# Patient Record
Sex: FEMALE | Race: WHITE
[De-identification: names, ages, dates, MRNs, and addresses within clinical notes are randomized per-mention and may not be internally consistent; named-entity substitution may affect disease eponyms.]

---

## 2017-10-12 ENCOUNTER — HOSPITAL ENCOUNTER (EMERGENCY)
Dept: HOSPITAL 36 - ER | Age: 37
LOS: 1 days | Discharge: HOME | End: 2017-10-13
Payer: MEDICAID

## 2017-10-12 DIAGNOSIS — R07.89: Primary | ICD-10-CM

## 2017-10-12 PROCEDURE — Z7502: HCPCS

## 2017-10-12 NOTE — ED PHYSICIAN CHART
ED Chief Complaint/HPI





- Patient Information


Date Seen:: 10/12/17


Time Seen:: 23:10


Chief Complaint:: chest pain


History of Present Illness:: 





4 hours ago the patient developed pleuritic right-sided chest pain right-sided 

chest pain.  Patient's had no recent upper respiratory tract infection, fever 

or cough.


Allergies:: 


 Allergies











Allergy/AdvReac Type Severity Reaction Status Date / Time


 


No Known Allergies Allergy   Verified 10/12/17 22:55











Vitals:: 


 Vital Signs - 8 hr











  10/12/17





  22:45


 


Temp 98.1 F


 


HR 79


 


RR 18


 


/98


 


O2 Sat % 98











Historian:: Patient, Family Member


Review:: Nurse's Note Reviewed





ED Review of Systems





- Review of Systems


General/Constitutional: No fever, No chills


Skin: No skin lesions, No bruising


Head: No headache


Eyes: No loss of vision


ENT: No earache, No sore throat


Neck: No neck pain, No swelling, No thyromegaly, No stiffness


Cardio Vascular: Chest pain


Pulmonary: No SOB, No cough, No sputum, No wheezing


GI: No nausea, No vomiting, No diarrhea


G/U: No dysuria


Musculoskeletal: No bone or joint pain, No back pain, No muscle pain


Endocrine: No polyuria, No polydipsia


Psychiatric: No prior psych history


Hematopoietic: No bruising


Allergic/Immuno: No urticaria


Neurological: No syncope, No headache





ED Past Medical History





- Past Medical History


Past Medical History: No significant medical hx


Family History: None


Social History: Non Smoker, No Alcohol


Surgical History: None


Psychiatricy History: None


Medication: None





Family Medical History





- Family Member


  ** Mother


History Unknown: Yes


Ethnicity: Non-





ED Physical Exam





- Physical Examination


General/Constitutional: Well-developed, well-nourished, Alert, No distress


Head: Atraumatic


Eyes: Lids, conjuctiva normal, PERRL


Skin: Nl inspection, No rash, No skin lesions, No ecchymosis, Well hydrated


ENMT: External ears, nose nl, Lips, teeth, gums nl


Neck: No nuchal rigidity


Respiratory: Nl effort/Exclusion, Clear to Auscultation, No Wheeze/Rhonchi/Rales


Other Respiratory comments:: 





Clear symmetrical breath sounds


Cardio Vascular: RRR, No murmur, gallop, rubs, NL S1 S2


GI: No tenderness/rebounding/guarding, No organomegaly, No hernia, Normal BS's, 

Nondistended, No mass/bruits, No McBurney tenderness


: No CVA tenderness


Extremities: Normal digits & nails


Neuro/Psych: Alert/oriented, No focal deficits


Misc: Normal back





ED Assessment





- Assessment


General Assessment: 





Patient's pain improved after the albuterol breathing treatment





ED Septic Shock





- .


Is Septic Shock (SBP<90, OR Lactate>4 mmol\L) present?: No





- <6hrs of presentation:


Vital Signs: 


 Vital Signs - 8 hr











  10/12/17





  22:45


 


Temp 98.1 F


 


HR 79


 


RR 18


 


/98


 


O2 Sat % 98














ED Reassessment (Disposition)





- Reassessment


Reassessment Condition:: Improved





- Diagnosis


Diagnosis:: 





Atypical chest pain





- Aftercare/Follow up Instructions


Aftercare/Follow-Up Instructions:: Refer to Discharge Instructions





- Patient Disposition


Discharge/Transfer:: Home


Condition at Disposition:: Stable, Improved

## 2018-04-30 ENCOUNTER — HOSPITAL ENCOUNTER (EMERGENCY)
Dept: HOSPITAL 36 - ER | Age: 38
Discharge: HOME | End: 2018-04-30
Payer: MEDICAID

## 2018-04-30 DIAGNOSIS — R51: Primary | ICD-10-CM

## 2018-04-30 LAB
ALBUMIN SERPL-MCNC: 3.7 GM/DL (ref 3.7–5.3)
ALBUMIN/GLOB SERPL: 1.2 {RATIO} (ref 1–1.8)
ALP SERPL-CCNC: 36 U/L (ref 34–104)
ALT SERPL-CCNC: 12 U/L (ref 7–52)
AMPHET UR-MCNC: NEGATIVE NG/ML
ANION GAP SERPL CALC-SCNC: 11.9 MMOL/L (ref 7–16)
AST SERPL-CCNC: 13 U/L (ref 13–39)
BARBITURATES UR-MCNC: NEGATIVE UG/ML
BASOPHILS # BLD AUTO: 0 TH/CUMM (ref 0–0.2)
BASOPHILS NFR BLD AUTO: 0 % (ref 0–2)
BENZODIAZEPINES PNL UR: NEGATIVE
BILIRUB SERPL-MCNC: 0.4 MG/DL (ref 0.3–1)
BUN SERPL-MCNC: 8 MG/DL (ref 7–25)
CALCIUM SERPL-MCNC: 9.5 MG/DL (ref 8.6–10.3)
CANNABINOIDS SERPL QL CFM: NEGATIVE
CHLORIDE SERPL-SCNC: 105 MEQ/L (ref 98–107)
CHOLEST SERPL-MCNC: 198 MG/DL (ref ?–200)
CO2 SERPL-SCNC: 25.5 MEQ/L (ref 21–31)
COCAINE METAB.OTHER UR-MCNC: NEGATIVE NG/ML
CREAT SERPL-MCNC: 0.7 MG/DL (ref 0.6–1.2)
EOSINOPHIL # BLD AUTO: 0.2 TH/CMM (ref 0.1–0.4)
EOSINOPHIL NFR BLD AUTO: 1.6 % (ref 0–5)
ERYTHROCYTE [DISTWIDTH] IN BLOOD BY AUTOMATED COUNT: 13.8 % (ref 11.5–20)
GLOBULIN SER-MCNC: 3.2 GM/DL
GLUCOSE SERPL-MCNC: 86 MG/DL (ref 70–105)
HCT VFR BLD CALC: 39.8 % (ref 41–60)
HDLC SERPL-MCNC: 37 MG/DL (ref 23–92)
HGB BLD-MCNC: 13 GM/DL (ref 12–16)
LYMPHOCYTE AB SER FC-ACNC: 4.6 TH/CMM (ref 1.5–3)
LYMPHOCYTES NFR BLD AUTO: 44.6 % (ref 20–50)
MCH RBC QN AUTO: 24.4 PG (ref 27–31)
MCHC RBC AUTO-ENTMCNC: 32.8 PG (ref 28–36)
MCV RBC AUTO: 74.5 FL (ref 81–100)
METHADONE UR CFM-MCNC: NEGATIVE NG/ML
METHAMPHET UR QL: NEGATIVE
MONOCYTES # BLD AUTO: 0.8 TH/CMM (ref 0.3–1)
MONOCYTES NFR BLD AUTO: 7.8 % (ref 2–10)
NEUTROPHILS # BLD: 4.7 TH/CMM (ref 1.8–8)
NEUTROPHILS NFR BLD AUTO: 46 % (ref 40–80)
OPIATES UR QL: NEGATIVE
PCP UR-MCNC: NEGATIVE UG/L
PLATELET # BLD: 363 TH/CMM (ref 150–400)
PMV BLD AUTO: 8.3 FL
POTASSIUM SERPL-SCNC: 4.4 MEQ/L (ref 3.5–5.1)
RBC # BLD AUTO: 5.34 MIL/CMM (ref 3.8–5.1)
SODIUM SERPL-SCNC: 138 MEQ/L (ref 136–145)
TRICYCLICS UR QL: NEGATIVE
TRIGL SERPL-MCNC: 136 MG/DL (ref ?–150)
WBC # BLD AUTO: 10.3 TH/CMM (ref 4.8–10.8)

## 2018-04-30 PROCEDURE — 99285 EMERGENCY DEPT VISIT HI MDM: CPT

## 2018-04-30 PROCEDURE — 80053 COMPREHEN METABOLIC PANEL: CPT

## 2018-04-30 PROCEDURE — 80061 LIPID PANEL: CPT

## 2018-04-30 PROCEDURE — 85025 COMPLETE CBC W/AUTO DIFF WBC: CPT

## 2018-04-30 PROCEDURE — 86141 C-REACTIVE PROTEIN HS: CPT

## 2018-04-30 PROCEDURE — 36415 COLL VENOUS BLD VENIPUNCTURE: CPT

## 2018-04-30 PROCEDURE — 70450 CT HEAD/BRAIN W/O DYE: CPT

## 2018-04-30 PROCEDURE — 80307 DRUG TEST PRSMV CHEM ANLYZR: CPT

## 2018-04-30 PROCEDURE — Z7610: HCPCS

## 2018-04-30 PROCEDURE — 81025 URINE PREGNANCY TEST: CPT

## 2018-04-30 NOTE — TRANSFER SUMMARY
DATE OF TRANSFER:  04/30/2018



ADDENDUM AND FINAL DISCHARGE REPORT



Full code patient.  A 37-year-old female.



LABORATORY DATA:  WBC 10.3, hemoglobin 13, hematocrit 39.8, differential is

normal.  Electrolytes are all within normal limits.  BUN is 8, creatinine is 0.7

and glucose is 86.  The patient's LDL level is very high at 147.  Ideally it

should be less than 90 and less than 70 is preferable and she should not eat too

much of red meat and all kinds of pork and beef she is eating.  She should cut

back on the portions, cut back on high cholesterol and fat containing food and

advice from a dietitian may be more helpful to her.  The patient's CT scan has

come to be negative.  She finally acknowledged that she is taking for past 3

years, nonsteroidal anti-inflammatory medication.  It is possible that she may

be hypertensive for the past 3 years and the patient is discharged today with

Lipitor 20 mg at bedtime and she needs to take Lopressor 50 mg p.o. morning. 

She should not take any nonsteroidal anti-inflammatory drugs like Aleve, Motrin,

Toradol, Ultram or any other nonsteroidal anti-inflammatory drugs like

diclofenac etc.  The patient should cut back on portions of the cholesterol or

cholesterol containing compounds.  All instructions were given to the patient.



FINAL DIAGNOSES:

1.  Headache.

2.  Hyperlipidemia.

3.  The patient has may be mild gastritis secondary to using Motrin and she has

been using it for the past 3 years.

4.  Slightly overweight situation and all these things advised for this were all

given to the patient and the patient will not take Motrin and not use any other

drugs.





DD: 04/30/2018 22:06

DT: 04/30/2018 22:46

JOB# [id number]  [id number]

## 2018-04-30 NOTE — ER PHYSICIAN DOCUMENTATION
DATE OF SERVICE:  



EMERGENCY ROOM EVALUATION AND TREATMENT



HISTORY OF PRESENT ILLNESS:  This is a 37-year-old female patient who said that

around 4:00 when she got up to go to the bathroom, suddenly she had severe

headache in the occipital part of the headache and the entire head was hurting,

but more in the occipital part of the head and it continued the whole day.  She

took the doctor's appointment at 3:00.  She took 800 mg of Motrin to kill the

pain, but the pain did not go away.  She went and saw the doctor.  The doctor

gave her 5 mg of lisinopril and the patient was told to come to the Emergency

Room.  The patient did not take the medication.  She came to the hospital.  The

patient's past medical history is negative for hypertension.  She denied taking

any narcotics weeds, marijuana, or any other drugs.  Her drug screen was ordered

to see if there is anything positive in the drug.  Urine pregnancy test has been

ordered, I think it was negative, if not we can look at it again to see.  The

patient's blood pressure ever since that 3-4 times has been taken.  It is always

above around _____ and blood pressure is 145 is the lowest systolic blood

pressure.  The patient never had hypertension in her life according to her.  Her

parents never had hypertension.  She does not know much about the parents, her

mother and father, because they live in [place] and she lives here.  She did not

take any medication else other than 800 mg of Motrin.  Review of current

illnesses is that the patient got sudden headache and she did not blow up any

balloon or did not have any head injury.  There is no evidence of any meningeal

signs, Kernig's signs or Brudzinski sign.  There are no meningeal signs. 

Straight leg raising test is negative and there is no nuchal rigidity.  There is

no evidence of any tumor type of thing.  There is no seizure.  There are no

tremors.



PAST MEDICAL HISTORY:  Benign and negative.



PERSONAL HISTORY:  , 3 children.



ALLERGIES:  None known.



REVIEW OF SYSTEMS:

GENERAL:  Twelve-point review of system, she is awake, alert.

CONSTITUTIONAL:  Negative.  No fever, no chills, no rigors.  Does not take any

drugs.

CARDIAC:  No history of any chest pain.  No history of any myocardial

infarction, rheumatic fever, valvular heart disease, pericardial disease,

cardiomyopathy.

LUNGS WISE:  Clear.  She does not have any pneumonia or TB pulmonary embolism,

COPD, emphysema, bronchitis.

GI WISE:  No history of diarrhea, constipation, or vomiting.

ENDOCRINE WISE:  No history of diabetes mellitus, hypo or hyperthyroidism. 

Bones and joints, no apparent complaints.



A 12-point review of systems otherwise is benign and negative.



She does not take any medication.  Today, only for the first time she took 800

mg of Motrin and it surprising that for the first time without knowing about the

medications she is taking 800 mg of Motrin that suggests that it is very

possible that she might have taken Motrin or some other drugs that might thinned

up the blood or antiplatelet medications and might have bled in the brain, but

she does not look like she is a severe sick patient.  She looks calm.  She looks

comfortable.



PHYSICAL EXAMINATION:

GENERAL:  The patient appears to be awake, alert, oriented.  General examination

is otherwise benign and negative.  Meningeal signs as negative.  No edema, no

cyanosis, no petechia, no ecchymosis.

CHEST:  Clear.  Trachea being central.  Fairly good air entry in both lungs.

HEART:  Reveals normal heart sounds.  Soft fourth heart sound.  Second heart

sound is physiologically split.  Third heart sound is absent.  Fourth heart

sound is soft, distant, benign, negative.  No abnormal murmur, no abnormal

systolic, no abnormal diastolic, no evidence of any pericardial rub.  No

evidence of any systolic murmur of aortic stenosis.  No evidence of any fevers,

chills or rigors.  No evidence of any sepsis or septicemia, etc. present.



Urine appeared to be clear, but it sent for urine examinations.  Urine for drug

screen has been ordered by me.



The patient is ordered to get lisinopril 10 mg, metoprolol 50 mg, Zofran 4 mg by

mouth and Tylenol 1 gram by mouth right now.  The patient surgical wise had 3

surgeries, 3 C-sections and she does not know why 3 C-sections were done.  She

was told to take 3 , she went ahead and had 3 babies with 3 C-sections,

2 girls and one son.



CLINICAL IMPRESSION:  The patient had sudden onset of headache.  She does not

look like having ruptured aneurysm, but one thing we need to rule out is

ruptured berry aneurysm.  I do not think so, but we will rule it out. 

Intracranial hemorrhage and any other possibilities, we will rule it out.  So,

CT scan will be done to rule out if there is anything suddenly happen to her. 

It is possible that she may not be telling us the truth that she is taking

Motrin on a regular basis and that can cause a bleeding problem and cause

platelet dysfunction and may cause intracranial bleeding also; GI bleeding also

is a very possibility.  Doctor gave her 5 mg of lisinopril that they did not

take it.  The patient's pain is 10/10, but looking at her, it does not even look

like 3/10 because she does not have that kind of pain. 

Vital signs taken by the triage nurse shows temperature to be 98.2, pulse of 82,

respirations 18, blood pressure 141/98, then it became 145/95 and then 143/90 or

so in that level, 99% oxygen saturation, height of 154 cm, weight 162 pounds. 

She is mildly overweight.  Last menstrual period is going on now.    x

3 and she does not take any drugs or other medications.  So, the plan is to get

a CT scan of the brain done and it is possible that the patient may be

hypertensive for a long time, but she says she has never been hypertensive, so I

am not sure what is the cause.  It could be some things that she ate or some

injury to the head or it is just a simple migraine to start with can give rise

to this kind of symptoms.  So as long as the CT scan is normal, we can just give

her Tylenol and Motrin etc. as needed.





DD: 2018 21:20

DT: 2018 22:36

JOB# [id number]  [id number]

## 2018-05-01 NOTE — DIAGNOSTIC IMAGING REPORT
CT scan of the brain without contrast 



History: Headache



Total DLP equals 497



CTDI equals 30.3



Axial sections were obtained from the base of the skull to the vertex.



There is a normal ventricular system size. No focal parenchymal lesions

are seen. No evidence of any mass effect or shift of midline structures.

No extra-axial masses or abnormal fluid collections.



Impression: Negative examination

## 2019-06-11 ENCOUNTER — HOSPITAL ENCOUNTER (EMERGENCY)
Dept: HOSPITAL 36 - ER | Age: 39
Discharge: HOME | End: 2019-06-11
Payer: MEDICAID

## 2019-06-11 DIAGNOSIS — Z98.890: ICD-10-CM

## 2019-06-11 DIAGNOSIS — M54.2: ICD-10-CM

## 2019-06-11 DIAGNOSIS — H66.92: Primary | ICD-10-CM

## 2019-06-11 PROCEDURE — 99283 EMERGENCY DEPT VISIT LOW MDM: CPT

## 2019-06-11 PROCEDURE — 96372 THER/PROPH/DIAG INJ SC/IM: CPT

## 2019-06-11 PROCEDURE — Z7502: HCPCS

## 2019-06-11 NOTE — ED PHYSICIAN CHART
ED Chief Complaint/HPI





- Patient Information


Date Seen:: 19


Time Seen:: 22:44


Chief Complaint:: Left ear pain


History of Present Illness:: 


39 yo female had left ear pain, left facial and left upper neck pain for 1 

week. Pt noticed some blood in the left ear last week. Pt denied hearing loss 

or fever. Pt saw her PMD who prescribed Keflex 500 mg q6h for 7 days. Pt 

finished the antibiotics and still had significant left ear pain. Pt also felt 

headache and dizziness. 


Allergies:: 


 Allergies











Allergy/AdvReac Type Severity Reaction Status Date / Time


 


No Known Allergies Allergy   Verified 19 22:36











Vitals:: 


 Vital Signs - 8 hr











  19





  22:30


 


Temp 99.0 F


 


HR 77


 


RR 18


 


/83


 


O2 Sat % 99














ED Review of Systems





- Review of Systems


General/Constitutional: No fever, No chills


Skin: No rash


Head: Headache


Eyes: No pain


ENT: No nasal drainage


Neck: Neck pain


Cardio Vascular: No chest pain


Pulmonary: No SOB


GI: No nausea, No vomiting


G/U: No dysuria


Musculoskeletal: No bone or joint pain


Neurological: No focal symptoms





ED Past Medical History





- Past Medical History


Past Medical History: No significant medical hx


Social History: Non Smoker, No Alcohol, No Drug Use


Surgical History:  (x 3, )





Family Medical History





- Family Member


  ** Mother


History Unknown: Yes


Ethnicity: Non-





ED Assessment





- Assessment


General Assessment: 


Otitis media, failed antibiotics, Keflex





Assessment/Comments:: 


Rocephin 1g IM x 1


D/c home


Augmentin 875/125 mg PO q12h x 10 days





ED Septic Shock





- .


Is Septic Shock (SBP<90, OR Lactate>4 mmol\L) present?: No





- <6hrs of presentation:


Vital Signs: 


 Vital Signs - 8 hr











  19





  22:30


 


Temp 99.0 F


 


HR 77


 


RR 18


 


/83


 


O2 Sat % 99














ED Reassessment (Disposition)





- Reassessment


Reassessment Condition:: Improved





- Aftercare/Follow up Instructions


Notes:: 


F/u PCP and ENT, or return to ER if symptoms worsen.





Medication Prescribed:: 


Augmentin 875/125 mg PO bid x 10 days





- Patient Disposition


Discharge/Transfer:: Home

## 2019-09-10 ENCOUNTER — HOSPITAL ENCOUNTER (EMERGENCY)
Dept: HOSPITAL 36 - ER | Age: 39
LOS: 1 days | Discharge: LEFT BEFORE DISCHARGE | End: 2019-09-11
Payer: MEDICAID

## 2019-09-10 DIAGNOSIS — K81.0: ICD-10-CM

## 2019-09-10 DIAGNOSIS — Z98.890: ICD-10-CM

## 2019-09-10 DIAGNOSIS — N39.0: Primary | ICD-10-CM

## 2019-09-10 DIAGNOSIS — I10: ICD-10-CM

## 2019-09-10 DIAGNOSIS — K44.9: ICD-10-CM

## 2019-09-10 DIAGNOSIS — D72.829: ICD-10-CM

## 2019-09-10 DIAGNOSIS — N85.8: ICD-10-CM

## 2019-09-10 LAB
ALBUMIN SERPL-MCNC: 4.3 GM/DL (ref 3.7–5.3)
ALBUMIN/GLOB SERPL: 1.4 {RATIO} (ref 1–1.8)
ALP SERPL-CCNC: 50 U/L (ref 34–104)
ALT SERPL-CCNC: 11 U/L (ref 7–52)
AMYLASE SERPL-CCNC: 87 U/L (ref 29–103)
ANION GAP SERPL CALC-SCNC: 11.7 MMOL/L (ref 7–16)
AST SERPL-CCNC: 13 U/L (ref 13–39)
BASOPHILS # BLD AUTO: 0 TH/CUMM (ref 0–0.2)
BASOPHILS NFR BLD AUTO: 0 % (ref 0–2)
BILIRUB SERPL-MCNC: 0.4 MG/DL (ref 0.3–1)
BUN SERPL-MCNC: 17 MG/DL (ref 7–25)
CALCIUM SERPL-MCNC: 9.7 MG/DL (ref 8.6–10.3)
CHLORIDE SERPL-SCNC: 102 MEQ/L (ref 98–107)
CO2 SERPL-SCNC: 27.6 MEQ/L (ref 21–31)
CREAT SERPL-MCNC: 0.7 MG/DL (ref 0.6–1.2)
EOSINOPHIL # BLD AUTO: 0.3 TH/CMM (ref 0.1–0.4)
EOSINOPHIL NFR BLD AUTO: 2.6 % (ref 0–5)
ERYTHROCYTE [DISTWIDTH] IN BLOOD BY AUTOMATED COUNT: 14 % (ref 11.5–20)
GLOBULIN SER-MCNC: 3 GM/DL
GLUCOSE SERPL-MCNC: 93 MG/DL (ref 70–105)
HCT VFR BLD CALC: 43.8 % (ref 41–60)
HGB BLD-MCNC: 14.2 GM/DL (ref 12–16)
INR PPP: 0.95 (ref 0.5–1.4)
LIPASE SERPL-CCNC: 61 U/L (ref 11–82)
LYMPHOCYTE AB SER FC-ACNC: 5.9 TH/CMM (ref 1.5–3)
LYMPHOCYTES NFR BLD AUTO: 54.3 % (ref 20–50)
MCH RBC QN AUTO: 25.8 PG (ref 27–31)
MCHC RBC AUTO-ENTMCNC: 32.4 PG (ref 28–36)
MCV RBC AUTO: 79.4 FL (ref 81–100)
MONOCYTES # BLD AUTO: 0.9 TH/CMM (ref 0.3–1)
MONOCYTES NFR BLD AUTO: 7.9 % (ref 2–10)
NEUTROPHILS # BLD: 3.8 TH/CMM (ref 1.8–8)
NEUTROPHILS NFR BLD AUTO: 35.2 % (ref 40–80)
PLATELET # BLD: 333 TH/CMM (ref 150–400)
POTASSIUM SERPL-SCNC: 4.3 MEQ/L (ref 3.5–5.1)
RBC # BLD AUTO: 5.51 MIL/CMM (ref 3.8–5.1)
SODIUM SERPL-SCNC: 137 MEQ/L (ref 136–145)
WBC # BLD AUTO: 10.9 TH/CMM (ref 4.8–10.8)

## 2019-09-10 PROCEDURE — Z7610: HCPCS

## 2019-09-10 PROCEDURE — C9113 INJ PANTOPRAZOLE SODIUM, VIA: HCPCS

## 2019-09-10 NOTE — ED PHYSICIAN CHART
ED Chief Complaint/HPI





- Patient Information


Date Seen:: 09/10/19


Time Seen:: 22:50


Chief Complaint:: Abdominal pain 


History of Present Illness:: 


37 yo female developed sudden onset of abdominal pain 3 hours ago after eating 

cheese sandwich. The pain was located in the epigastric region radiating to the 

back, constant, 10/10. Pt also noticed rashes at upper chest. Pt had a large 

emesis of food content in the ER.


Allergies:: 


 Allergies











Allergy/AdvReac Type Severity Reaction Status Date / Time


 


No Known Allergies Allergy   Verified 19 22:36











Vitals:: 


 Vital Signs - 8 hr











  09/10/19





  22:42


 


Temp 98.6 F


 


HR 95


 


RR 17


 


/111


 


O2 Sat % 100














ED Review of Systems





- Review of Systems


General/Constitutional: No fever, No chills


Skin: No rash


Head: Headache


Eyes: No pain


ENT: No nasal drainage


Neck: No neck pain


Cardio Vascular: No chest pain


Pulmonary: No SOB


GI: Nausea, Vomiting, Pain


Musculoskeletal: No bone or joint pain


Psychiatric: No prior psych history


Neurological: No focal symptoms





ED Past Medical History





- Past Medical History


Past Medical History: HTN


Social History: Non Smoker, No Alcohol, No Drug Use


Surgical History:  (x 3)





Family Medical History





- Family Member


  ** Mother


History Unknown: Yes


Ethnicity: Non-





ED Physical Exam





- Physical Examination


General/Constitutional: Awake, Alert


Head: Atraumatic


Eyes: PERRL, EOMI


Other Skin comments:: 


Rashes in the upper chest


ENMT: Nasal exam nl


Neck: No nuchal rigidity


Respiratory: No Wheeze/Rhonchi/Rales


Cardio Vascular: RRR, No murmur, gallop, rubs, NL S1 S2


Other GI comments:: 


Epigastric and RUQ tenderness


Extremities: normal strength in all extremities


Neuro/Psych: Alert/oriented, No focal deficits





ED Labs/Radiology/EKG Results





- Lab Results


Results: 





 Laboratory Last Values











WBC  10.9 Th/cmm (4.8-10.8)  H  09/10/19  23:10    


 


RBC  5.51 Mil/cmm (3.80-5.10)  H  09/10/19  23:10    


 


Hgb  14.2 gm/dL (12-16)   09/10/19  23:10    


 


Hct  43.8 % (41.0-60)   09/10/19  23:10    


 


MCV  79.4 fl ()  L  09/10/19  23:10    


 


MCH  25.8 pg (27.0-31.0)  L  09/10/19  23:10    


 


MCHC Differential  32.4 pg (28.0-36.0)   09/10/19  23:10    


 


RDW  14.0 % (11.5-20.0)   09/10/19  23:10    


 


Plt Count  333 Th/cmm (150-400)   09/10/19  23:10    


 


MPV  8.3 fl  09/10/19  23:10    


 


Neutrophils %  35.2 % (40.0-80.0)  L  09/10/19  23:10    


 


Lymphocytes %  54.3 % (20.0-50.0)  H  09/10/19  23:10    


 


Monocytes %  7.9 % (2.0-10.0)   09/10/19  23:10    


 


Eosinophils %  2.6 % (0.0-5.0)   09/10/19  23:10    


 


Basophils %  0.0 % (0.0-2.0)   09/10/19  23:10    


 


PT  9.9 SECONDS (9.5-11.5)   09/10/19  23:10    


 


INR  0.95  (0.5-1.4)   09/10/19  23:10    


 


PTT (Actin FS)  26.0 SECONDS (26.0-38.0)   09/10/19  23:10    


 


Sodium  137 mEq/L (136-145)   09/10/19  23:10    


 


Potassium  4.3 mEq/L (3.5-5.1)   09/10/19  23:10    


 


Chloride  102 mEq/L ()   09/10/19  23:10    


 


Carbon Dioxide  27.6 mEq/L (21.0-31.0)   09/10/19  23:10    


 


Anion Gap  11.7  (7.0-16.0)   09/10/19  23:10    


 


BUN  17 mg/dL (7-25)   09/10/19  23:10    


 


Creatinine  0.7 mg/dL (0.6-1.2)   09/10/19  23:10    


 


Est GFR ( Amer)  > 60.0 ml/min (>90)   09/10/19  23:10    


 


Est GFR (Non-Af Amer)  > 60.0 ml/min  09/10/19  23:10    


 


BUN/Creatinine Ratio  24.3   09/10/19  23:10    


 


Glucose  93 mg/dL ()   09/10/19  23:10    


 


Calcium  9.7 mg/dL (8.6-10.3)   09/10/19  23:10    


 


Total Bilirubin  0.4 mg/dL (0.3-1.0)   09/10/19  23:10    


 


AST  13 U/L (13-39)   09/10/19  23:10    


 


ALT  11 U/L (7-52)   09/10/19  23:10    


 


Alkaline Phosphatase  50 U/L ()   09/10/19  23:10    


 


Total Protein  7.3 gm/dL (6.0-8.3)   09/10/19  23:10    


 


Albumin  4.3 gm/dL (3.7-5.3)   09/10/19  23:10    


 


Globulin  3.0 gm/dL  09/10/19  23:10    


 


Albumin/Globulin Ratio  1.4  (1.0-1.8)   09/10/19  23:10    


 


Amylase  87 U/L ()   09/10/19  23:10    


 


Lipase  61 U/L (11-82)   09/10/19  23:10    


 


Urine Source  MIDSTREAM   19  00:10    


 


Urine Color  YELLOW   19  00:10    


 


Urine Clarity  HAZY  (CLEAR)   19  00:10    


 


Urine pH  6.5  (4.6 - 8.0)   19  00:10    


 


Ur Specific Gravity  1.020  (1.005-1.030)   19  00:10    


 


Urine Protein  TRACE mg/dL (NEGATIVE)   19  00:10    


 


Urine Glucose (UA)  NEGATIVE mg/dL (NEGATIVE)   19  00:10    


 


Urine Ketones  NEGATIVE mg/dL (NEGATIVE)   19  00:10    


 


Urine Blood  LARGE  (NEGATIVE)  H  19  00:10    


 


Urine Nitrate  NEGATIVE  (NEGATIVE)   19  00:10    


 


Urine Bilirubin  NEGATIVE  (NEGATIVE)   19  00:10    


 


Urine Urobilinogen  0.2 E.U./dL (0.2 - 1.0)   19  00:10    


 


Ur Leukocyte Esterase  LARGE  (NEGATIVE)  H  19  00:10    


 


Urine RBC  5-10 /hpf (0-5)  H  19  00:10    


 


Urine WBC  10-25 /hpf (0-5)  H  19  00:10    


 


Ur Epithelial Cells  RARE /lpf (FEW)   19  00:10    


 


Urine Bacteria  OCCASIONAL /hpf (NONE SEEN)   19  00:10    


 


Urine Pregnancy Test  NEGATIVE   19  00:10    














- Radiology Results


Results: 


CT abdomen/pelvis: Gall stones. One calcified stone near the gallbladder neck 

or cystic duct. Mild gallbladder wall thickening and minimal surrounding 

stranding. Correlate for cholecystitis. Small hiatal hernia. Markedly enlarged 

lower uterine segment/cervical region/mass up to 7cm. 





ED Assessment





- Assessment


General Assessment: 


Acute abdominal pain 


Acute cholecystitis


Urinary tract infection


Leukocytosis


Small hiatal hernia


Uterine mass





Assessment/Comments:: 


CBC, CMP, lipase, amylase, PT/PTT, UA, urine hCG


CT abdomen/pelvis wo contrast


Pantoprazole IV


Zofran IV


Morphine IV (pt refused)


Tylenol PO


Zosyn IV


NS 1L IV bolus








ED Septic Shock





- .


Is Septic Shock (SBP<90, OR Lactate>4 mmol\L) present?: No





- <6hrs of presentation:


Vital Signs: 


 Vital Signs - 8 hr











  09/10/19





  22:42


 


Temp 98.6 F


 


HR 95


 


RR 17


 


/111


 


O2 Sat % 100














ED Reassessment (Disposition)





- Reassessment


Reassessment:: 


Pt left AMA without waiting for CT abdomen/pelvis result


Reassessment Condition:: Improved





- Patient Disposition


Discharge/Transfer:: Against Medical Advice

## 2019-09-11 LAB
APPEARANCE UR: (no result)
BACTERIA #/AREA URNS HPF: (no result) /HPF
BILIRUB UR-MCNC: NEGATIVE MG/DL
COLOR UR: YELLOW
EPI CELLS URNS QL MICRO: (no result) /LPF
GLUCOSE UR STRIP-MCNC: NEGATIVE MG/DL
KETONES UR STRIP-MCNC: NEGATIVE MG/DL
LEUKOCYTE ESTERASE UR-ACNC: (no result)
MICRO URNS: YES
NITRITE UR QL STRIP: NEGATIVE
PH UR STRIP: 6.5 [PH] (ref 4.6–8)
PROT UR STRIP-MCNC: (no result) MG/DL
RBC # UR STRIP: (no result) /UL
RBC #/AREA URNS HPF: (no result) /HPF (ref 0–5)
SP GR UR STRIP: 1.02 (ref 1–1.03)
URINALYSIS COMPLETE PNL UR: (no result)
UROBILINOGEN UR STRIP-ACNC: 0.2 E.U./DL (ref 0.2–1)

## 2019-09-11 NOTE — DIAGNOSTIC IMAGING REPORT
CT abdomen and pelvis without intravenous contrast



Indication: Abdominal pain



Comparison: None,



Technique: Axial images were obtained from the lung bases to the

bilateral proximal femurs without IV contrast.  Coronal reconstructions

were made.  total DLP: 465,  CTDI9.7



FINDINGS:



Hypoventilatory and atelectatic changes of the lung bases are noted. 

Evaluation of solid organs is limited due to lack of IV contrast.  No

evidence of focal hepatic lesions.  



Distended gallbladder is noted with gallstones.  There is a 5 mm stone

the region near the gallbladder neck or less likely cystic duct region. 

No focal splenic lesions.  



1mm microcalcification the body the pancreas is noted likely due to old

inflammatory process.  No focal Adrenal lesions.  



No hydronephrosis or nephrolithiasis.  Minimal bladder wall thickening

is noted.  



There is a large lower uterine/cervical fairly homogeneous mass

measuring 8.6 and is transverse by 6.8 cm AP by 7 cm craniocaudal.  Mild

localized mass effect is noted.  



Moderate stool is seen throughout the colon with generalized gas-filled

loops of bowel.  Small hiatal hernia is noted.  No free fluid or free

air.  Mild degenerative changes spine and pelvis are noted.



IMPRESSION:



Large mass along the lower uterine or cervix region.  This may represent

a large fibroid, however, other gynecologic masses cannot be excluded. 

Recommend clinical correlation further assessment pelvic ultrasound.



Cholelithiasis with distended gallbladder.  There is a small stone

within the neck or less likely cystic duct region.  Recommend follow-up

with ultrasound.



Minimal urinary bladder wall thickening.  Inflammatory process cannot be

excluded.



Small hiatal hernia.